# Patient Record
Sex: MALE | Race: NATIVE HAWAIIAN OR OTHER PACIFIC ISLANDER | Employment: OTHER | URBAN - NONMETROPOLITAN AREA
[De-identification: names, ages, dates, MRNs, and addresses within clinical notes are randomized per-mention and may not be internally consistent; named-entity substitution may affect disease eponyms.]

---

## 2021-07-18 ENCOUNTER — APPOINTMENT (OUTPATIENT)
Dept: GENERAL RADIOLOGY | Age: 71
End: 2021-07-18
Payer: MEDICARE

## 2021-07-18 ENCOUNTER — HOSPITAL ENCOUNTER (EMERGENCY)
Age: 71
Discharge: HOME | End: 2021-07-18
Attending: FAMILY MEDICINE
Payer: MEDICARE

## 2021-07-18 VITALS
TEMPERATURE: 97.5 F | BODY MASS INDEX: 24.91 KG/M2 | OXYGEN SATURATION: 95 % | HEIGHT: 66 IN | DIASTOLIC BLOOD PRESSURE: 61 MMHG | HEART RATE: 81 BPM | WEIGHT: 155 LBS | RESPIRATION RATE: 16 BRPM | SYSTOLIC BLOOD PRESSURE: 134 MMHG

## 2021-07-18 DIAGNOSIS — M54.12 CERVICAL RADICULOPATHY: Primary | ICD-10-CM

## 2021-07-18 PROCEDURE — 99284 EMERGENCY DEPT VISIT MOD MDM: CPT

## 2021-07-18 PROCEDURE — 72040 X-RAY EXAM NECK SPINE 2-3 VW: CPT

## 2021-07-18 RX ORDER — CYCLOBENZAPRINE HCL 10 MG
10 TABLET ORAL 3 TIMES DAILY PRN
Qty: 30 TABLET | Refills: 0 | Status: SHIPPED | OUTPATIENT
Start: 2021-07-18 | End: 2021-07-28

## 2021-07-18 RX ORDER — NAPROXEN 500 MG/1
500 TABLET ORAL 2 TIMES DAILY PRN
Qty: 30 TABLET | Refills: 0 | Status: SHIPPED | OUTPATIENT
Start: 2021-07-18

## 2021-07-18 ASSESSMENT — PAIN SCALES - GENERAL
PAINLEVEL_OUTOF10: 5
PAINLEVEL_OUTOF10: 5

## 2021-07-18 ASSESSMENT — PAIN - FUNCTIONAL ASSESSMENT: PAIN_FUNCTIONAL_ASSESSMENT: 0-10

## 2021-07-18 ASSESSMENT — ENCOUNTER SYMPTOMS
VOMITING: 0
SHORTNESS OF BREATH: 0
CHEST TIGHTNESS: 0
NAUSEA: 0
COUGH: 0

## 2021-07-18 ASSESSMENT — PAIN DESCRIPTION - LOCATION: LOCATION: NECK

## 2021-07-18 ASSESSMENT — PAIN DESCRIPTION - PAIN TYPE: TYPE: ACUTE PAIN

## 2021-07-18 NOTE — ED PROVIDER NOTES
134/61 and his pulse is 81. His respiration is 16 and oxygen saturation is 95%. Physical Exam  Vitals and nursing note reviewed. Constitutional:       General: He is in acute distress. Neck:      Comments: Cervical paraspinal muscle spasms extending into upper trapezius area   Musculoskeletal:         General: Tenderness present. No swelling, deformity or signs of injury. Cervical back: Tenderness present. Lymphadenopathy:      Cervical: No cervical adenopathy. Skin:     General: Skin is dry. Findings: No erythema or rash. Neurological:      General: No focal deficit present. Mental Status: He is alert and oriented to person, place, and time. Sensory: No sensory deficit. Psychiatric:         Mood and Affect: Mood normal.         Behavior: Behavior normal.         DIFFERENTIAL DIAGNOSIS:   Cervical radiculopathy,fracture neck nos,    DIAGNOSTIC RESULTS     EKG: All EKG's are interpreted by the Emergency Department Physician who either signs or Co-signs this chart in the absence of a cardiologist.    RADIOLOGY: non-plain film images(s) such as CT, Ultrasound and MRI are read by the radiologist.      XR CERVICAL SPINE (2-3 VIEWS) (Final result)  Result time 07/18/21 13:21:43  Procedure changed from XR CERVICAL SPINE (4-5 VIEWS)  Final result by Rayne Lundborg, MD (07/18/21 13:21:43)                Impression:        1. Degenerative changes in the cervical spine. No acute findings. **This report has been created using voice recognition software. It may contain minor errors which are inherent in voice recognition technology. **     Final report electronically signed by Dr. Rayne Lundborg on 7/18/2021 1:21 PM            Narrative:    PROCEDURE: XR CERVICAL SPINE (2-3 VIEWS)     CLINICAL INFORMATION: neck pain, no trauma. Radicular pain down left arm. COMPARISON: No prior study. TECHNIQUE: 3 views of the cervical spine including AP view, a lateral view and an odontoid view. FINDINGS:       There is slight reversal of the normal cervical lordosis. There is endplate sclerosis and loss of disc height at the C4-5, C5-6 and C6-7 levels.  C1 to the top of T1 are seen. Dewitte Peto are no fractures.  There are facet degenerative changes at the C2-3   through C5-6 levels. There is no prevertebral soft tissue swelling.   No suspicious osseous lesions are present.  There are some degenerative changes at the C1-2 articulation. LABS:   Labs Reviewed - No data to display    EMERGENCY DEPARTMENT COURSE:   Vitals:    Vitals:    07/18/21 1248   BP: 134/61   Pulse: 81   Resp: 16   Temp: 97.5 °F (36.4 °C)   TempSrc: Oral   SpO2: 95%   Weight: 155 lb (70.3 kg)   Height: 5' 6\" (1.676 m)     On exam the patient is slightly bent over. He does note some pain that extends in his mid neck to upper trapezius area. On palpation he does grimace slightly with palpation to the spinous processes of the cervical vertebrae. There is some paraspinal muscle spasming in the cervical vertebrae and extends into the upper trapezius on the left and right side. Distal upper extremities are warm to the touch pulses are intact. Lung sounds are otherwise clear. X-rays of the cervical spine were obtained. Generative changes are noted in the cervical spine. No acute findings. There is endplate sclerosis and loss of disc height at the C4-5, C5-6 and C6-C7 levels. At this time symptoms seem to be most consistent with a cervical radiculopathy. I will start the patient on muscle relaxers, and NSAIDs. I will recommend further follow-up with his PCP if symptoms persist.  An MRI may later point be necessary. Care instructions were provided. CRITICAL CARE:       CONSULTS:      PROCEDURES:  None    FINAL IMPRESSION      1. Cervical radiculopathy          DISPOSITION/PLAN   Home. Care instructions provided. Follow-up with PCP or ED as needed.     PATIENT REFERRED TO:  Alex Hart, CHELI - CNP  [address] [facility] [id number]  [phone]    Schedule an appointment as soon as possible for a visit in 3 days  If symptoms worsen      DISCHARGE MEDICATIONS:  New Prescriptions    CYCLOBENZAPRINE (FLEXERIL) 10 MG TABLET    Take 1 tablet by mouth 3 times daily as needed for Muscle spasms    NAPROXEN (NAPROSYN) 500 MG TABLET    Take 1 tablet by mouth 2 times daily as needed for Pain       (Please note that portions of this note were completed with a voice recognition program.  Efforts were made to edit the dictations but occasionally words are mis-transcribed.)    MD Pasquale Nugent MD  07/18/21 [id number]

## 2021-07-18 NOTE — ED NOTES
Patient presents today complaining of pain in his neck that has been worsening over the past week. He denies any known injuries. He reports doing lots of yard work and lifting heavy objects in the last week. He also went to [facility] a week ago. He reports tingling going down bilateral arms. States it is difficult to find a comfortable position to sleep in. He noted \"slight\" improvement of neck pain with Aleve at home.      Fiordaliza Santos RN  07/18/21 0508